# Patient Record
Sex: FEMALE | Race: WHITE | Employment: FULL TIME | URBAN - METROPOLITAN AREA
[De-identification: names, ages, dates, MRNs, and addresses within clinical notes are randomized per-mention and may not be internally consistent; named-entity substitution may affect disease eponyms.]

---

## 2020-08-03 ENCOUNTER — APPOINTMENT (OUTPATIENT)
Dept: GENERAL RADIOLOGY | Age: 65
End: 2020-08-03
Payer: COMMERCIAL

## 2020-08-03 ENCOUNTER — HOSPITAL ENCOUNTER (EMERGENCY)
Age: 65
Discharge: HOME | End: 2020-08-03
Attending: EMERGENCY MEDICINE
Payer: COMMERCIAL

## 2020-08-03 VITALS
HEART RATE: 78 BPM | WEIGHT: 150.9 LBS | TEMPERATURE: 98.9 F | DIASTOLIC BLOOD PRESSURE: 87 MMHG | OXYGEN SATURATION: 98 % | SYSTOLIC BLOOD PRESSURE: 151 MMHG | RESPIRATION RATE: 17 BRPM | BODY MASS INDEX: 25.9 KG/M2

## 2020-08-03 PROCEDURE — 99283 EMERGENCY DEPT VISIT LOW MDM: CPT

## 2020-08-03 PROCEDURE — 73562 X-RAY EXAM OF KNEE 3: CPT

## 2020-08-03 RX ORDER — LATANOPROSTENE BUNOD 0.24 MG/ML
SOLUTION/ DROPS OPHTHALMIC
COMMUNITY

## 2020-08-03 RX ORDER — IBUPROFEN 400 MG/1
400 TABLET ORAL EVERY 8 HOURS PRN
Qty: 20 TABLET | Refills: 1 | Status: SHIPPED | OUTPATIENT
Start: 2020-08-03

## 2020-08-03 ASSESSMENT — PAIN SCALES - GENERAL: PAINLEVEL_OUTOF10: 7

## 2020-08-03 ASSESSMENT — PAIN DESCRIPTION - PAIN TYPE: TYPE: ACUTE PAIN

## 2020-08-03 ASSESSMENT — PAIN DESCRIPTION - LOCATION: LOCATION: KNEE

## 2020-08-03 ASSESSMENT — PAIN DESCRIPTION - ORIENTATION: ORIENTATION: RIGHT;LEFT

## 2020-08-03 NOTE — ED PROVIDER NOTES
TRIAGE CHIEF COMPLAINT:   Chief Complaint   Patient presents with    Knee Pain    Head Injury    Fall         HPI: Kia Corey is a 63 y.o. female who presents to the Emergency Department with complaint of bilateral knee pain. Last night at work at [employer] she slipped on a wet floor and fell to her left side hitting her knees together. She states she may have bumped the left side of her head on the door as she fell. No loss of consciousness. Denies neck pain or back pain. She has no nausea or vomiting. Denies dizziness or vertigo. No numbness or weakness. She has no upper extremity injury or pain. She states her left knee is primarily the one sore. This is a [facility] injury      REVIEW OF SYSTEMS:  6 systems reviewed. Pertinent positives per HPI. Otherwise noted to be negative. Nursing notes reviewed and agree with above. Past medical/surgical history reviewed. MEDICATIONS   Patient's Medications   New Prescriptions    No medications on file   Previous Medications    BRIMONIDINE TARTRATE OP    Apply to eye    DORZOLAMIDE HCL OP    Apply to eye    HYDROCHLOROTHIAZIDE PO    Take by mouth    LATANOPROSTENE BUNOD (VYZULTA) 0.024 % SOLN    Apply to eye    NAPROXEN (NAPROSYN) 500 MG TABLET    Take 1 tablet by mouth 2 times daily    TIMOLOL (BETIMOL) 0.25 % OPHTHALMIC SOLUTION    1-2 drops 2 times daily. Modified Medications    No medications on file   Discontinued Medications    No medications on file         ALLERGIES   Allergies   Allergen Reactions    Hydrocodone Other (See Comments)     dizzy           BP (!) 151/87   Pulse 78   Temp 98.9 °F (37.2 °C) (Oral)   Resp 17   Wt 150 lb 14.4 oz (68.4 kg)   SpO2 98%   BMI 25.90 kg/m²   General:  No acute distress. Non toxic appearance  Head:   Patient has some slight tenderness without swelling in the left postauricular area. There is no skin break. Eyes:   Conjunctiva clear, WINTER, EOM's intact. Sclera anicteric.   ENT:   Mucous membranes moist.  TMs are normal.  No facial tenderness. Neck:   Supple. No adenopathy or jugular venous distension. No bony tenderness. Lungs/Chest:  No respiratory distress  CVS:   Regular rate and rhythm  Abdomen:  Nontender  Extremities:  Upper extremities show full range of motion with no swelling bruising or tenderness. Right leg shows minimal swelling in the medial infrapatellar region with no laxity or effusion. Range of motion is normal.  No hip ankle or foot tenderness. Distal neurovascular exam is intact. Left leg shows some peripatellar soft tissue swelling with probable effusion and limited range of motion 0 to approximately 90 degrees. No gross laxity. No popliteal tenderness. No hip ankle or foot tenderness. Distal neurovascular exams intact. Skin:   No rashes or lesions to exposed skin  Back:   No spine, rib or CVA tenderness  Neuro:  Alert and OX3. Speech clear and appropriate. No upper/lower extremity weakness. Normal sensation in all extremities. No facial asymmetry or weakness. Gait normal.  Psych:   Affect normal. Mood normal        RADIOLOGY:  XR KNEE RIGHT (3 VIEWS)   Final Result      No acute bony pathology. Mild degenerative changes      XR KNEE LEFT (3 VIEWS)   Final Result      No acute bony pathology. Mild-to-moderate degenerative findings          LAB      ED COURSE / MDM:  61-year-old female, [employer] employee, slipped on a wet floor at work last night falling to her left side and injuring both knees. She states she may have bumped the left side of her head on the door and she fell but there was no loss of consciousness. She has some slight tenderness without swelling in the left postauricular area. No neck pain or back pain. No vomiting dizziness numbness or weakness. She is neurologically intact and hemodynamically stable. Left knee shows some peripatellar swelling with probable effusion but no gross laxity.   Right knee shows some localized mild medial infrapatellar swelling with no laxity or effusion. X-rays of the right knee read by the radiologist and reviewed by myself shows mild degenerative changes with no acute bony abnormality. X-ray of the left knee read by the radiologist reviewed by myself shows mild to moderate degenerative changes with no acute bony abnormality. Patient was given  an Ace wrap to wear on the left knee. I recommended rest ice and elevation for both knees. She was given a prescription for ibuprofen to take if needed for pain. I recommended follow-up with [facility] or your [employer] doctor before she returns to work. I discussed with Ailyn Zuniga the results of the evaluation in the Emergency Department, diagnosis, care, prognosis and the importance of follow-up. The patient is stable for discharge. The patient and/or family are in agreement with the plan and all questions have been answered. Specific discharge instructions were explained, including reasons to return to the emergency department.       (Please note that portions of this note may have been completed with a voice recognition program.  Efforts were made to edit the dictation but occasionally words are mis-transcribed)        FINAL IMPRESSION:  1 --right knee contusion  2 --left knee sprain  3 --closed head injury  4 --fall                   Saintclair Billings, MD  08/03/20 157 [facility] Tone Briggs MD  08/03/20 [id number]

## 2020-08-03 NOTE — ED NOTES
Pt states understanding of d/c instructions and medications. ACE applied by RT. Pt alert and oriented with steady gait upon discharge.       Sin Machuca RN  08/03/20 [id number]

## 2020-08-03 NOTE — ED TRIAGE NOTES
Patient states she fell at work ([employer]) last night by slipping in water on floor. C/o bilateral knee pain. Tender spot to left scalp where she states she hit her head. Denies LOC. Took Aleve today.